# Patient Record
Sex: FEMALE | Race: WHITE | URBAN - NONMETROPOLITAN AREA
[De-identification: names, ages, dates, MRNs, and addresses within clinical notes are randomized per-mention and may not be internally consistent; named-entity substitution may affect disease eponyms.]

---

## 2017-02-12 ENCOUNTER — OFFICE VISIT (OUTPATIENT)
Dept: PRIMARY CARE CLINIC | Age: 32
End: 2017-02-12

## 2017-02-12 VITALS
BODY MASS INDEX: 31.14 KG/M2 | HEIGHT: 64 IN | SYSTOLIC BLOOD PRESSURE: 124 MMHG | WEIGHT: 182.4 LBS | OXYGEN SATURATION: 99 % | TEMPERATURE: 99.4 F | DIASTOLIC BLOOD PRESSURE: 74 MMHG | HEART RATE: 74 BPM

## 2017-02-12 DIAGNOSIS — R11.2 NAUSEA AND VOMITING, INTRACTABILITY OF VOMITING NOT SPECIFIED, UNSPECIFIED VOMITING TYPE: ICD-10-CM

## 2017-02-12 DIAGNOSIS — K52.9 GASTROENTERITIS: Primary | ICD-10-CM

## 2017-02-12 LAB
INFLUENZA A ANTIBODY: NORMAL
INFLUENZA B ANTIBODY: NORMAL

## 2017-02-12 PROCEDURE — 87804 INFLUENZA ASSAY W/OPTIC: CPT | Performed by: NURSE PRACTITIONER

## 2017-02-12 PROCEDURE — 99213 OFFICE O/P EST LOW 20 MIN: CPT | Performed by: NURSE PRACTITIONER

## 2017-02-12 RX ORDER — ONDANSETRON 4 MG/1
4 TABLET, FILM COATED ORAL EVERY 8 HOURS PRN
Qty: 10 TABLET | Refills: 0 | Status: SHIPPED | OUTPATIENT
Start: 2017-02-12 | End: 2017-04-25 | Stop reason: ALTCHOICE

## 2017-02-12 ASSESSMENT — ENCOUNTER SYMPTOMS
DIARRHEA: 1
CHANGE IN BOWEL HABIT: 1
NAUSEA: 1
VOMITING: 1
RESPIRATORY NEGATIVE: 1

## 2017-04-14 ENCOUNTER — OFFICE VISIT (OUTPATIENT)
Dept: PRIMARY CARE CLINIC | Age: 32
End: 2017-04-14
Payer: COMMERCIAL

## 2017-04-14 VITALS
WEIGHT: 187.6 LBS | HEART RATE: 64 BPM | DIASTOLIC BLOOD PRESSURE: 70 MMHG | TEMPERATURE: 98.3 F | BODY MASS INDEX: 32.03 KG/M2 | OXYGEN SATURATION: 98 % | SYSTOLIC BLOOD PRESSURE: 118 MMHG | HEIGHT: 64 IN

## 2017-04-14 DIAGNOSIS — J01.40 ACUTE PANSINUSITIS, RECURRENCE NOT SPECIFIED: Primary | ICD-10-CM

## 2017-04-14 PROCEDURE — 99213 OFFICE O/P EST LOW 20 MIN: CPT | Performed by: PHYSICIAN ASSISTANT

## 2017-04-14 RX ORDER — SULFAMETHOXAZOLE AND TRIMETHOPRIM 800; 160 MG/1; MG/1
1 TABLET ORAL 2 TIMES DAILY
Qty: 20 TABLET | Refills: 0 | Status: SHIPPED | OUTPATIENT
Start: 2017-04-14 | End: 2017-04-24

## 2017-04-14 RX ORDER — PREDNISONE 20 MG/1
20 TABLET ORAL 2 TIMES DAILY
Qty: 10 TABLET | Refills: 0 | Status: SHIPPED | OUTPATIENT
Start: 2017-04-14 | End: 2017-04-19

## 2017-04-14 ASSESSMENT — ENCOUNTER SYMPTOMS
SORE THROAT: 1
COUGH: 1
SHORTNESS OF BREATH: 1
WHEEZING: 1

## 2017-04-25 ENCOUNTER — OFFICE VISIT (OUTPATIENT)
Dept: PRIMARY CARE CLINIC | Age: 32
End: 2017-04-25
Payer: COMMERCIAL

## 2017-04-25 VITALS
TEMPERATURE: 101.4 F | BODY MASS INDEX: 32.1 KG/M2 | HEART RATE: 102 BPM | DIASTOLIC BLOOD PRESSURE: 64 MMHG | RESPIRATION RATE: 12 BRPM | SYSTOLIC BLOOD PRESSURE: 110 MMHG | HEIGHT: 64 IN | OXYGEN SATURATION: 99 % | WEIGHT: 188 LBS

## 2017-04-25 DIAGNOSIS — T78.40XA ALLERGIC REACTION CAUSED BY A DRUG, INITIAL ENCOUNTER: Primary | ICD-10-CM

## 2017-04-25 PROCEDURE — 99213 OFFICE O/P EST LOW 20 MIN: CPT | Performed by: NURSE PRACTITIONER

## 2017-04-25 RX ORDER — METHYLPREDNISOLONE 4 MG/1
TABLET ORAL
Qty: 1 KIT | Refills: 0 | Status: SHIPPED | OUTPATIENT
Start: 2017-04-25 | End: 2017-05-08 | Stop reason: ALTCHOICE

## 2017-04-25 ASSESSMENT — ENCOUNTER SYMPTOMS
TROUBLE SWALLOWING: 0
ABDOMINAL PAIN: 0
CONSTIPATION: 0
RESPIRATORY NEGATIVE: 1
COUGH: 0
NAUSEA: 0
EYES NEGATIVE: 1
SINUS PRESSURE: 0
GASTROINTESTINAL NEGATIVE: 1
VOMITING: 0
CHEST TIGHTNESS: 0
ALLERGIC/IMMUNOLOGIC NEGATIVE: 1
SHORTNESS OF BREATH: 0
DIARRHEA: 0

## 2017-05-08 ENCOUNTER — OFFICE VISIT (OUTPATIENT)
Dept: PRIMARY CARE CLINIC | Age: 32
End: 2017-05-08
Payer: COMMERCIAL

## 2017-05-08 VITALS
TEMPERATURE: 98.3 F | WEIGHT: 194 LBS | SYSTOLIC BLOOD PRESSURE: 112 MMHG | HEART RATE: 84 BPM | DIASTOLIC BLOOD PRESSURE: 78 MMHG | BODY MASS INDEX: 33.3 KG/M2 | OXYGEN SATURATION: 99 %

## 2017-05-08 DIAGNOSIS — J02.9 SORE THROAT: ICD-10-CM

## 2017-05-08 DIAGNOSIS — J32.9 SINUSITIS, UNSPECIFIED CHRONICITY, UNSPECIFIED LOCATION: Primary | ICD-10-CM

## 2017-05-08 DIAGNOSIS — J30.89 ENVIRONMENTAL AND SEASONAL ALLERGIES: ICD-10-CM

## 2017-05-08 LAB — S PYO AG THROAT QL: NORMAL

## 2017-05-08 PROCEDURE — 96372 THER/PROPH/DIAG INJ SC/IM: CPT | Performed by: NURSE PRACTITIONER

## 2017-05-08 PROCEDURE — 99213 OFFICE O/P EST LOW 20 MIN: CPT | Performed by: NURSE PRACTITIONER

## 2017-05-08 PROCEDURE — 87880 STREP A ASSAY W/OPTIC: CPT | Performed by: NURSE PRACTITIONER

## 2017-05-08 RX ORDER — TOBRAMYCIN 3 MG/ML
0.3 SOLUTION/ DROPS OPHTHALMIC 2 TIMES DAILY
COMMUNITY
Start: 2017-04-30 | End: 2017-12-21 | Stop reason: ALTCHOICE

## 2017-05-08 RX ORDER — TRIAMCINOLONE ACETONIDE 40 MG/ML
40 INJECTION, SUSPENSION INTRA-ARTICULAR; INTRAMUSCULAR ONCE
Status: COMPLETED | OUTPATIENT
Start: 2017-05-08 | End: 2017-05-08

## 2017-05-08 RX ADMIN — TRIAMCINOLONE ACETONIDE 40 MG: 40 INJECTION, SUSPENSION INTRA-ARTICULAR; INTRAMUSCULAR at 18:10

## 2017-05-08 ASSESSMENT — ENCOUNTER SYMPTOMS
RHINORRHEA: 1
COUGH: 1
SORE THROAT: 1

## 2017-12-20 VITALS
BODY MASS INDEX: 32.1 KG/M2 | DIASTOLIC BLOOD PRESSURE: 70 MMHG | HEART RATE: 68 BPM | WEIGHT: 188 LBS | HEIGHT: 64 IN | SYSTOLIC BLOOD PRESSURE: 120 MMHG

## 2017-12-20 RX ORDER — CETIRIZINE HYDROCHLORIDE 10 MG/1
10 TABLET ORAL DAILY
COMMUNITY
End: 2018-11-29 | Stop reason: ALTCHOICE

## 2017-12-20 RX ORDER — ALBUTEROL SULFATE 90 UG/1
2 AEROSOL, METERED RESPIRATORY (INHALATION) EVERY 6 HOURS PRN
COMMUNITY
End: 2017-12-21 | Stop reason: SDUPTHER

## 2017-12-20 RX ORDER — MONTELUKAST SODIUM 10 MG/1
10 TABLET ORAL DAILY
COMMUNITY
End: 2017-12-21 | Stop reason: ALTCHOICE

## 2017-12-21 ENCOUNTER — OFFICE VISIT (OUTPATIENT)
Dept: FAMILY MEDICINE CLINIC | Age: 32
End: 2017-12-21
Payer: COMMERCIAL

## 2017-12-21 VITALS
RESPIRATION RATE: 12 BRPM | BODY MASS INDEX: 33.63 KG/M2 | WEIGHT: 197 LBS | SYSTOLIC BLOOD PRESSURE: 126 MMHG | OXYGEN SATURATION: 98 % | HEIGHT: 64 IN | HEART RATE: 90 BPM | DIASTOLIC BLOOD PRESSURE: 70 MMHG

## 2017-12-21 DIAGNOSIS — Z23 NEED FOR PNEUMOCOCCAL VACCINATION: ICD-10-CM

## 2017-12-21 DIAGNOSIS — Z01.419 ENCOUNTER FOR GYNECOLOGICAL EXAMINATION WITHOUT ABNORMAL FINDING: Primary | ICD-10-CM

## 2017-12-21 DIAGNOSIS — J45.20 MILD INTERMITTENT ASTHMA WITHOUT COMPLICATION: ICD-10-CM

## 2017-12-21 DIAGNOSIS — N94.6 DYSMENORRHEA: ICD-10-CM

## 2017-12-21 LAB — PAP AGE BASED: NORMAL

## 2017-12-21 PROCEDURE — S0610 ANNUAL GYNECOLOGICAL EXAMINA: HCPCS | Performed by: NURSE PRACTITIONER

## 2017-12-21 RX ORDER — ALBUTEROL SULFATE 90 UG/1
2 AEROSOL, METERED RESPIRATORY (INHALATION) EVERY 6 HOURS PRN
Qty: 1 INHALER | Refills: 0 | Status: SHIPPED | OUTPATIENT
Start: 2017-12-21 | End: 2018-11-29 | Stop reason: SDUPTHER

## 2017-12-21 RX ORDER — NORGESTIMATE AND ETHINYL ESTRADIOL 0.25-0.035
KIT ORAL
Qty: 28 TABLET | Refills: 11 | Status: SHIPPED | OUTPATIENT
Start: 2017-12-21 | End: 2018-11-29 | Stop reason: SDUPTHER

## 2017-12-21 ASSESSMENT — ENCOUNTER SYMPTOMS
CONSTIPATION: 0
DIARRHEA: 0
BLOOD IN STOOL: 0
COUGH: 0
WHEEZING: 0
ABDOMINAL PAIN: 0
SHORTNESS OF BREATH: 0

## 2017-12-21 NOTE — PROGRESS NOTES
allergies       Health Maintenance   Topic Date Due    Pneumococcal med risk (1 of 1 - PPSV23) 02/27/2005    Flu vaccine (1) 12/21/2018 (Originally 9/1/2017)    HIV screen  12/21/2018 (Originally 2/27/2001)    Cervical cancer screen  01/20/2019    DTaP/Tdap/Td vaccine (2 - Td) 09/14/2022     Health Habits: With regard to her health habits, she eats 3 meals and 2 snacks per day. She does exercise regularly: Physical activities include: yoga, 1 times per week, 60 minutes/day. She does not take over the counter vitamins. She never had a blood transfusion or tattoo. She wears seatbelts while riding in a car. She does not text or talk on the phone while driving. She performs all of her ADL's without problem. She is independent, she cooks, drives, bathes, and gets dressed without assistance. She is not . She has 0 children. She does work. She works 60 hours a week. She is happy with her life. She rates her stress level a 6 in a scale 1-10. Health Maintenance   Topic Date Due    Pneumococcal med risk (1 of 1 - PPSV23) 02/27/2005    Flu vaccine (1) 12/21/2018 (Originally 9/1/2017)    HIV screen  12/21/2018 (Originally 2/27/2001)    Cervical cancer screen  01/20/2019    DTaP/Tdap/Td vaccine (2 - Td) 09/14/2022       She reports that she has never smoked. She has never used smokeless tobacco. She reports that she drinks alcohol. She reports that she does not use drugs. She does not perform regular breast self exams. Review of Systems   Constitutional: Negative for appetite change, chills, fatigue, fever and unexpected weight change. HENT: Negative. Respiratory: Negative for cough, shortness of breath and wheezing. Cardiovascular: Negative for chest pain, palpitations and leg swelling. Gastrointestinal: Negative for abdominal pain, blood in stool, constipation and diarrhea. Endocrine: Negative for cold intolerance, heat intolerance, polydipsia and polyuria.    Genitourinary: Negative for dysuria, frequency, menstrual problem, pelvic pain, urgency, vaginal discharge and vaginal pain. Skin: Negative for rash. Neurological: Negative for dizziness, weakness, numbness and headaches. Hematological: Negative for adenopathy. Psychiatric/Behavioral: Negative for dysphoric mood and sleep disturbance. The patient is not nervous/anxious. Blood pressure 126/70, pulse 90, resp. rate 12, height 5' 3.75\" (1.619 m), weight 197 lb (89.4 kg), last menstrual period 12/06/2017, SpO2 98 %, not currently breastfeeding. Physical Exam   Constitutional: She is oriented to person, place, and time. She appears well-developed and well-nourished. HENT:   Head: Normocephalic. Right Ear: Tympanic membrane and ear canal normal.   Left Ear: Tympanic membrane and ear canal normal.   Eyes: Pupils are equal, round, and reactive to light. Neck: Normal range of motion. Neck supple. No thyromegaly present. Cardiovascular: Normal rate, regular rhythm, normal heart sounds and intact distal pulses. Pulmonary/Chest: Effort normal and breath sounds normal. No respiratory distress. She has no wheezes. Right breast exhibits no mass, no nipple discharge, no skin change and no tenderness. Left breast exhibits no mass, no nipple discharge, no skin change and no tenderness. Breasts are symmetrical.   Abdominal: Soft. Bowel sounds are normal. There is no hepatosplenomegaly. There is no tenderness. Genitourinary: Rectum normal, vagina normal and uterus normal. There is no lesion on the right labia. There is no lesion on the left labia. Cervix exhibits no motion tenderness. Right adnexum displays no mass and no tenderness. Left adnexum displays no mass and no tenderness. Musculoskeletal: She exhibits no edema. Lymphadenopathy:     She has no cervical adenopathy. She has no axillary adenopathy. Right: No supraclavicular adenopathy present. Left: No supraclavicular adenopathy present.    Neurological: She is alert and oriented to person, place, and time. She has normal strength. No cranial nerve deficit. Skin: Skin is warm, dry and intact. No rash noted. Psychiatric: She has a normal mood and affect. ASSESSMENT:    1. Encounter for gynecological examination without abnormal finding    2. Dysmenorrhea    3. Mild intermittent asthma without complication    4. Need for pneumococcal vaccination        PLAN:  Medications Discontinued During This Encounter   Medication Reason    ALBUTEROL IN Therapy completed    Norgestim-Eth Estrad Triphasic (TRI-SPRINTEC PO) Duplicate Order    tobramycin (TOBREX) 0.3 % ophthalmic solution Therapy completed    montelukast (SINGULAIR) 10 MG tablet Therapy completed    albuterol sulfate  (90 Base) MCG/ACT inhaler Reorder    SPRINTEC 28 0.25-35 MG-MCG per tablet Reorder       Orders Placed This Encounter   Medications    albuterol sulfate  (90 Base) MCG/ACT inhaler     Sig: Inhale 2 puffs into the lungs every 6 hours as needed for Wheezing     Dispense:  1 Inhaler     Refill:  0    norgestimate-ethinyl estradiol (SPRINTEC 28) 0.25-35 MG-MCG per tablet     Sig: TAKE ONE TABLET BY MOUTH ONCE DAILY     Dispense:  28 tablet     Refill:  11     Please consider 90 day supplies to promote better adherence    pneumococcal polyvalent (PNEUMOVAX 23) 25 MCG/0.5ML inj     Sig: Inject 0.5 mLs into the muscle once for 1 dose     Dispense:  1 vial     Refill:  0     Orders Placed This Encounter   Procedures    PAP Age Based     Return for annual exam.  Pap per current recommendations. Encouraged monthly self breast exams, healthy diet and routine exercise. Instructed to continue current medications. She was also counseled on her preventative health maintenance recommendations and follow-up.     Electronically signed by Mona De Guzman CNP on 12/31/2017

## 2017-12-30 ENCOUNTER — OFFICE VISIT (OUTPATIENT)
Dept: PRIMARY CARE CLINIC | Age: 32
End: 2017-12-30
Payer: COMMERCIAL

## 2017-12-30 VITALS
DIASTOLIC BLOOD PRESSURE: 74 MMHG | WEIGHT: 198.8 LBS | HEIGHT: 64 IN | HEART RATE: 74 BPM | OXYGEN SATURATION: 98 % | BODY MASS INDEX: 33.94 KG/M2 | TEMPERATURE: 98.3 F | SYSTOLIC BLOOD PRESSURE: 112 MMHG

## 2017-12-30 DIAGNOSIS — K52.9 GASTROENTERITIS: Primary | ICD-10-CM

## 2017-12-30 PROCEDURE — 1036F TOBACCO NON-USER: CPT | Performed by: PHYSICIAN ASSISTANT

## 2017-12-30 PROCEDURE — G8417 CALC BMI ABV UP PARAM F/U: HCPCS | Performed by: PHYSICIAN ASSISTANT

## 2017-12-30 PROCEDURE — G8484 FLU IMMUNIZE NO ADMIN: HCPCS | Performed by: PHYSICIAN ASSISTANT

## 2017-12-30 PROCEDURE — 99213 OFFICE O/P EST LOW 20 MIN: CPT | Performed by: PHYSICIAN ASSISTANT

## 2017-12-30 PROCEDURE — G8427 DOCREV CUR MEDS BY ELIG CLIN: HCPCS | Performed by: PHYSICIAN ASSISTANT

## 2017-12-30 RX ORDER — ONDANSETRON 4 MG/1
4 TABLET, ORALLY DISINTEGRATING ORAL EVERY 8 HOURS PRN
Qty: 15 TABLET | Refills: 0 | Status: SHIPPED | OUTPATIENT
Start: 2017-12-30 | End: 2018-11-29

## 2017-12-30 ASSESSMENT — ENCOUNTER SYMPTOMS
CHANGE IN BOWEL HABIT: 1
VOMITING: 0
NAUSEA: 1

## 2017-12-30 NOTE — PATIENT INSTRUCTIONS
clean. Wash your hands, cutting boards, and countertops with hot soapy water frequently. · Cook meat until it is well done. · Do not eat raw eggs or uncooked sauces made with raw eggs. · Do not take chances. If food looks or tastes spoiled, throw it out. When should you call for help? Call 911 anytime you think you may need emergency care. For example, call if:  ? · You vomit blood or what looks like coffee grounds. ? · You passed out (lost consciousness). ? · You pass maroon or very bloody stools. ?Call your doctor now or seek immediate medical care if:  ? · You have severe belly pain. ? · You have signs of needing more fluids. You have sunken eyes, a dry mouth, and pass only a little dark urine. ? · You feel like you are going to faint. ? · You have increased belly pain that does not go away in 1 to 2 days. ? · You have new or increased nausea, or you are vomiting. ? · You have a new or higher fever. ? · Your stools are black and tarlike or have streaks of blood. ? Watch closely for changes in your health, and be sure to contact your doctor if:  ? · You are dizzy or lightheaded. ? · You urinate less than usual, or your urine is dark yellow or brown. ? · You do not feel better with each day that goes by. Where can you learn more? Go to [email or web address]://[email or web address]. org and sign in to your [employer] account. Enter N142 in the [facility] box to learn more about \"Gastroenteritis: Care Instructions. \"     If you do not have an account, please click on the \"Sign Up Now\" link. Current as of: March 3, 2017  Content Version: 11.4  © 5788-4065 [employer]. Care instructions adapted under license by [facility] ([facility]). If you have questions about a medical condition or this instruction, always ask your healthcare professional. [address]  any warranty or liability for your use of this information.

## 2017-12-30 NOTE — LETTER
[facility]  Matthew Frye  Phone: [phone]  Fax: [phone]        Alaina Strange MD      December 30, 2017    Patient:   Dayanna Morton  Date of Birth   1985  Date of visit   12/30/2017        To Whom it May Concern:      Dayanna Morton was seen in my clinic on 12/30/2017. Please excuse from work 12/30/2017 and 12/31/2017. If you have any questions or concerns, please don't hesitate to call.       Sincerely,      Alaina Strange MD  Lb

## 2018-10-07 ENCOUNTER — OFFICE VISIT (OUTPATIENT)
Dept: PRIMARY CARE CLINIC | Age: 33
End: 2018-10-07
Payer: COMMERCIAL

## 2018-10-07 VITALS
HEART RATE: 84 BPM | BODY MASS INDEX: 33.37 KG/M2 | TEMPERATURE: 98 F | SYSTOLIC BLOOD PRESSURE: 116 MMHG | OXYGEN SATURATION: 98 % | DIASTOLIC BLOOD PRESSURE: 74 MMHG | WEIGHT: 194.4 LBS

## 2018-10-07 DIAGNOSIS — H10.32 ACUTE BACTERIAL CONJUNCTIVITIS OF LEFT EYE: Primary | ICD-10-CM

## 2018-10-07 PROCEDURE — G8417 CALC BMI ABV UP PARAM F/U: HCPCS | Performed by: PHYSICIAN ASSISTANT

## 2018-10-07 PROCEDURE — 1036F TOBACCO NON-USER: CPT | Performed by: PHYSICIAN ASSISTANT

## 2018-10-07 PROCEDURE — G8484 FLU IMMUNIZE NO ADMIN: HCPCS | Performed by: PHYSICIAN ASSISTANT

## 2018-10-07 PROCEDURE — G8427 DOCREV CUR MEDS BY ELIG CLIN: HCPCS | Performed by: PHYSICIAN ASSISTANT

## 2018-10-07 PROCEDURE — 99213 OFFICE O/P EST LOW 20 MIN: CPT | Performed by: PHYSICIAN ASSISTANT

## 2018-10-07 RX ORDER — MOXIFLOXACIN 5 MG/ML
1 SOLUTION/ DROPS OPHTHALMIC 3 TIMES DAILY
Qty: 1 BOTTLE | Refills: 0 | Status: SHIPPED | OUTPATIENT
Start: 2018-10-07 | End: 2018-10-14

## 2018-10-07 ASSESSMENT — ENCOUNTER SYMPTOMS
RHINORRHEA: 0
DOUBLE VISION: 0
RESPIRATORY NEGATIVE: 1
EYE ITCHING: 1
EYE REDNESS: 1
EYE DISCHARGE: 1

## 2018-11-29 ENCOUNTER — OFFICE VISIT (OUTPATIENT)
Dept: FAMILY MEDICINE CLINIC | Age: 33
End: 2018-11-29
Payer: COMMERCIAL

## 2018-11-29 VITALS
BODY MASS INDEX: 34.5 KG/M2 | RESPIRATION RATE: 14 BRPM | SYSTOLIC BLOOD PRESSURE: 124 MMHG | DIASTOLIC BLOOD PRESSURE: 80 MMHG | OXYGEN SATURATION: 98 % | TEMPERATURE: 97 F | WEIGHT: 201 LBS | HEART RATE: 64 BPM

## 2018-11-29 DIAGNOSIS — N94.6 DYSMENORRHEA: ICD-10-CM

## 2018-11-29 DIAGNOSIS — J30.2 SEASONAL ALLERGIES: ICD-10-CM

## 2018-11-29 DIAGNOSIS — Z00.00 ENCOUNTER FOR WELLNESS EXAMINATION IN ADULT: Primary | ICD-10-CM

## 2018-11-29 DIAGNOSIS — J45.20 MILD INTERMITTENT ASTHMA WITHOUT COMPLICATION: ICD-10-CM

## 2018-11-29 PROCEDURE — G8484 FLU IMMUNIZE NO ADMIN: HCPCS | Performed by: NURSE PRACTITIONER

## 2018-11-29 PROCEDURE — 99395 PREV VISIT EST AGE 18-39: CPT | Performed by: NURSE PRACTITIONER

## 2018-11-29 RX ORDER — NORGESTIMATE AND ETHINYL ESTRADIOL 0.25-0.035
KIT ORAL
Qty: 28 TABLET | Refills: 11 | Status: SHIPPED | OUTPATIENT
Start: 2018-11-29

## 2018-11-29 RX ORDER — LORATADINE 10 MG/1
10 TABLET ORAL DAILY
COMMUNITY

## 2018-11-29 RX ORDER — ALBUTEROL SULFATE 90 UG/1
2 AEROSOL, METERED RESPIRATORY (INHALATION) EVERY 6 HOURS PRN
Qty: 1 INHALER | Refills: 0 | Status: SHIPPED | OUTPATIENT
Start: 2018-11-29

## 2018-11-29 ASSESSMENT — PATIENT HEALTH QUESTIONNAIRE - PHQ9
SUM OF ALL RESPONSES TO PHQ9 QUESTIONS 1 & 2: 0
2. FEELING DOWN, DEPRESSED OR HOPELESS: 0
SUM OF ALL RESPONSES TO PHQ QUESTIONS 1-9: 0
1. LITTLE INTEREST OR PLEASURE IN DOING THINGS: 0
SUM OF ALL RESPONSES TO PHQ QUESTIONS 1-9: 0

## 2018-11-29 ASSESSMENT — ENCOUNTER SYMPTOMS
HOARSE VOICE: 0
NAUSEA: 0
COUGH: 0
BELCHING: 0
SORE THROAT: 0
VOICE CHANGE: 0

## 2018-11-29 NOTE — PROGRESS NOTES
[address] [address] [address]  Dept: [phone]  Dept Fax: [phone]      Chief Complaint   Patient presents with    Annual Exam    Health Maintenance     will do not do the pnuemonia at this time due to cost. patient will call and schedule with the health dept    Gastroesophageal Reflux     family hx. Noticing  over the course of the year. burping more of the strong foods after meals. HPI  Patient presents today for annual wellness exam.    Gastroesophageal Reflux   She complains of wheezing. She reports no abdominal pain, no belching, no chest pain, no coughing, no hoarse voice, no nausea or no sore throat. This is a chronic problem. The problem occurs occasionally. The problem has been unchanged. The symptoms are aggravated by certain foods and stress. Pertinent negatives include no fatigue. There are no known risk factors. She has tried nothing for the symptoms. Asthma   She complains of shortness of breath and wheezing. There is no chest tightness, cough, difficulty breathing, frequent throat clearing, hoarse voice or sputum production. This is a chronic problem. The current episode started more than 1 year ago. The problem occurs rarely. Pertinent negatives include no chest pain, fever, headaches, myalgias or sore throat. Her symptoms are alleviated by steroid inhaler. She reports complete improvement on treatment. Her past medical history is significant for asthma. Patient states she uses her inhaler 2 times a year- none this past year. Inhaler is  and needs refill.     BP Readings from Last 3 Encounters:   18 124/80   10/07/18 116/74   17 112/74       Current Outpatient Prescriptions   Medication Sig Dispense Refill    loratadine (CLARITIN) 10 MG tablet Take 10 mg by mouth daily      norgestimate-ethinyl estradiol (SPRINTEC 28) 0.25-35 MG-MCG per tablet TAKE ONE TABLET BY MOUTH ONCE DAILY 28 tablet 11  albuterol sulfate  (90 Base) MCG/ACT inhaler Inhale 2 puffs into the lungs every 6 hours as needed for Wheezing 1 Inhaler 0     No current facility-administered medications for this visit. Past Medical History:   Diagnosis Date    Asthma     Seasonal allergies        No past surgical history on file. No family history on file. Social History     Social History    Marital status:      Spouse name: N/A    Number of children: N/A    Years of education: N/A     Occupational History    Not on file. Social History Main Topics    Smoking status: Never Smoker    Smokeless tobacco: Never Used    Alcohol use Yes      Comment: rarely    Drug use: No    Sexual activity: Not Currently     Other Topics Concern    Not on file     Social History Narrative    No narrative on file       Allergies   Allergen Reactions    Bactrim [Sulfamethoxazole-Trimethoprim] Rash       Patient Active Problem List   Diagnosis    Asthma    Seasonal allergies    Dysmenorrhea       Health Maintenance   Topic Date Due    Pneumococcal med risk (1 of 1 - PPSV23) 02/27/2005    Flu vaccine (1) 12/21/2018 (Originally 9/1/2018)    HIV screen  12/21/2018 (Originally 2/27/2001)    Cervical cancer screen  12/21/2020    DTaP/Tdap/Td vaccine (2 - Td) 09/14/2022     Sexually active: Yes. Her last pap smear showed normal result and was done on 12/21/2017. Patient's last menstrual period was 11/07/2018 (approximate). Birth control method: OCP  She does not perform regular breast self exams. She reports that she has never smoked. She has never used smokeless tobacco. She reports that she drinks alcohol. She reports that she does not use drugs. She has not had a colonoscopy. Review of Systems   Constitutional: Negative for chills, fatigue and fever. HENT: Negative. Negative for hoarse voice, sore throat and voice change. Eyes: Negative. Respiratory: Positive for shortness of breath and wheezing.

## 2018-12-09 ASSESSMENT — ENCOUNTER SYMPTOMS
WHEEZING: 1
SHORTNESS OF BREATH: 1
CONSTIPATION: 0
FREQUENT THROAT CLEARING: 0
CHEST TIGHTNESS: 0
DIARRHEA: 0
SPUTUM PRODUCTION: 0
DIFFICULTY BREATHING: 0
EYES NEGATIVE: 1
ABDOMINAL PAIN: 0

## 2018-12-26 ENCOUNTER — OFFICE VISIT (OUTPATIENT)
Dept: PRIMARY CARE CLINIC | Age: 33
End: 2018-12-26
Payer: COMMERCIAL

## 2018-12-26 VITALS
DIASTOLIC BLOOD PRESSURE: 72 MMHG | HEIGHT: 64 IN | WEIGHT: 207.6 LBS | RESPIRATION RATE: 16 BRPM | TEMPERATURE: 98.2 F | OXYGEN SATURATION: 98 % | SYSTOLIC BLOOD PRESSURE: 110 MMHG | BODY MASS INDEX: 35.44 KG/M2 | HEART RATE: 82 BPM

## 2018-12-26 DIAGNOSIS — H66.001 ACUTE SUPPURATIVE OTITIS MEDIA OF RIGHT EAR WITHOUT SPONTANEOUS RUPTURE OF TYMPANIC MEMBRANE, RECURRENCE NOT SPECIFIED: Primary | ICD-10-CM

## 2018-12-26 PROCEDURE — 99213 OFFICE O/P EST LOW 20 MIN: CPT | Performed by: NURSE PRACTITIONER

## 2018-12-26 PROCEDURE — G8417 CALC BMI ABV UP PARAM F/U: HCPCS | Performed by: NURSE PRACTITIONER

## 2018-12-26 PROCEDURE — 1036F TOBACCO NON-USER: CPT | Performed by: NURSE PRACTITIONER

## 2018-12-26 PROCEDURE — G8484 FLU IMMUNIZE NO ADMIN: HCPCS | Performed by: NURSE PRACTITIONER

## 2018-12-26 PROCEDURE — G8427 DOCREV CUR MEDS BY ELIG CLIN: HCPCS | Performed by: NURSE PRACTITIONER

## 2018-12-26 RX ORDER — PREDNISONE 20 MG/1
20 TABLET ORAL 2 TIMES DAILY
Qty: 10 TABLET | Refills: 0 | Status: SHIPPED | OUTPATIENT
Start: 2018-12-26 | End: 2018-12-31

## 2018-12-26 RX ORDER — AMOXICILLIN AND CLAVULANATE POTASSIUM 875; 125 MG/1; MG/1
1 TABLET, FILM COATED ORAL 2 TIMES DAILY
Qty: 20 TABLET | Refills: 0 | Status: SHIPPED | OUTPATIENT
Start: 2018-12-26 | End: 2019-01-05

## 2018-12-26 RX ORDER — ACETAMINOPHEN 325 MG/1
650 TABLET ORAL EVERY 6 HOURS PRN
COMMUNITY

## 2018-12-26 RX ORDER — NAPROXEN 500 MG/1
500 TABLET ORAL 2 TIMES DAILY WITH MEALS
COMMUNITY

## 2018-12-26 ASSESSMENT — ENCOUNTER SYMPTOMS
COUGH: 0
SHORTNESS OF BREATH: 0
ABDOMINAL PAIN: 0
COLOR CHANGE: 0

## 2018-12-26 ASSESSMENT — PATIENT HEALTH QUESTIONNAIRE - PHQ9
SUM OF ALL RESPONSES TO PHQ QUESTIONS 1-9: 0
2. FEELING DOWN, DEPRESSED OR HOPELESS: 0
1. LITTLE INTEREST OR PLEASURE IN DOING THINGS: 0
SUM OF ALL RESPONSES TO PHQ9 QUESTIONS 1 & 2: 0
SUM OF ALL RESPONSES TO PHQ QUESTIONS 1-9: 0

## 2018-12-31 ENCOUNTER — OFFICE VISIT (OUTPATIENT)
Dept: PRIMARY CARE CLINIC | Age: 33
End: 2018-12-31
Payer: COMMERCIAL

## 2018-12-31 VITALS
BODY MASS INDEX: 35.3 KG/M2 | HEIGHT: 64 IN | TEMPERATURE: 97.6 F | OXYGEN SATURATION: 98 % | WEIGHT: 206.8 LBS | SYSTOLIC BLOOD PRESSURE: 126 MMHG | DIASTOLIC BLOOD PRESSURE: 70 MMHG | HEART RATE: 80 BPM

## 2018-12-31 DIAGNOSIS — H60.501 ACUTE OTITIS EXTERNA OF RIGHT EAR, UNSPECIFIED TYPE: Primary | ICD-10-CM

## 2018-12-31 PROCEDURE — G8417 CALC BMI ABV UP PARAM F/U: HCPCS | Performed by: NURSE PRACTITIONER

## 2018-12-31 PROCEDURE — G8427 DOCREV CUR MEDS BY ELIG CLIN: HCPCS | Performed by: NURSE PRACTITIONER

## 2018-12-31 PROCEDURE — G8484 FLU IMMUNIZE NO ADMIN: HCPCS | Performed by: NURSE PRACTITIONER

## 2018-12-31 PROCEDURE — 4130F TOPICAL PREP RX AOE: CPT | Performed by: NURSE PRACTITIONER

## 2018-12-31 PROCEDURE — 1036F TOBACCO NON-USER: CPT | Performed by: NURSE PRACTITIONER

## 2018-12-31 PROCEDURE — 99213 OFFICE O/P EST LOW 20 MIN: CPT | Performed by: NURSE PRACTITIONER

## 2018-12-31 ASSESSMENT — PATIENT HEALTH QUESTIONNAIRE - PHQ9
1. LITTLE INTEREST OR PLEASURE IN DOING THINGS: 0
SUM OF ALL RESPONSES TO PHQ QUESTIONS 1-9: 0
2. FEELING DOWN, DEPRESSED OR HOPELESS: 0
SUM OF ALL RESPONSES TO PHQ QUESTIONS 1-9: 0
SUM OF ALL RESPONSES TO PHQ9 QUESTIONS 1 & 2: 0